# Patient Record
Sex: FEMALE | Race: WHITE | NOT HISPANIC OR LATINO | URBAN - METROPOLITAN AREA
[De-identification: names, ages, dates, MRNs, and addresses within clinical notes are randomized per-mention and may not be internally consistent; named-entity substitution may affect disease eponyms.]

---

## 2017-07-27 ENCOUNTER — OUTPATIENT (OUTPATIENT)
Dept: OUTPATIENT SERVICES | Facility: HOSPITAL | Age: 82
LOS: 1 days | End: 2017-07-27
Payer: MEDICARE

## 2017-07-27 VITALS
HEART RATE: 77 BPM | RESPIRATION RATE: 16 BRPM | OXYGEN SATURATION: 97 % | SYSTOLIC BLOOD PRESSURE: 117 MMHG | DIASTOLIC BLOOD PRESSURE: 81 MMHG

## 2017-07-27 VITALS
SYSTOLIC BLOOD PRESSURE: 146 MMHG | OXYGEN SATURATION: 95 % | HEIGHT: 61 IN | HEART RATE: 68 BPM | WEIGHT: 130.95 LBS | TEMPERATURE: 98 F | RESPIRATION RATE: 20 BRPM | DIASTOLIC BLOOD PRESSURE: 76 MMHG

## 2017-07-27 DIAGNOSIS — H25.811 COMBINED FORMS OF AGE-RELATED CATARACT, RIGHT EYE: ICD-10-CM

## 2017-07-27 DIAGNOSIS — Z98.89 OTHER SPECIFIED POSTPROCEDURAL STATES: Chronic | ICD-10-CM

## 2017-07-27 DIAGNOSIS — S82.873A DISPLACED PILON FRACTURE OF UNSPECIFIED TIBIA, INITIAL ENCOUNTER FOR CLOSED FRACTURE: Chronic | ICD-10-CM

## 2017-07-27 DIAGNOSIS — Z98.890 OTHER SPECIFIED POSTPROCEDURAL STATES: Chronic | ICD-10-CM

## 2017-07-27 DIAGNOSIS — Z90.49 ACQUIRED ABSENCE OF OTHER SPECIFIED PARTS OF DIGESTIVE TRACT: Chronic | ICD-10-CM

## 2017-07-27 DIAGNOSIS — Z90.710 ACQUIRED ABSENCE OF BOTH CERVIX AND UTERUS: Chronic | ICD-10-CM

## 2017-07-27 PROCEDURE — C1780: CPT

## 2017-07-27 PROCEDURE — 66984 XCAPSL CTRC RMVL W/O ECP: CPT | Mod: RT

## 2017-07-27 NOTE — ASU DISCHARGE PLAN (ADULT/PEDIATRIC). - NOTIFY
Swelling that continues/Persistent Nausea and Vomiting/Pain not relieved by Medications/Fever greater than 101/Bleeding that does not stop

## 2017-07-27 NOTE — ASU DISCHARGE PLAN (ADULT/PEDIATRIC). - SPECIAL INSTRUCTIONS
Your eye patch will remain on overnight. Your doctor will remove it at your appointment tomorrow and instruct you on what drops to take at that time. Continue drops as usual in the other eye. Bring the eye kit and all of your other eye drops to the office for each visit. You may experience some itching or scratchiness following surgery. This is normal and is usually relieved with Tylenol which can be taken 650mg by mouth every 4-6 hours for pain. Avoid Aspirin or Motrin. If you experience persistent decrease in vision, pain, excessive bleeding , temperature above 101, persistent nausea or vomiting contact your surgeon at [phone].

## 2018-05-12 ENCOUNTER — INPATIENT (INPATIENT)
Facility: HOSPITAL | Age: 83
LOS: 1 days | Discharge: HOME | DRG: 641 | End: 2018-05-14
Attending: HOSPITALIST | Admitting: HOSPITALIST
Payer: MEDICARE

## 2018-05-12 VITALS — HEIGHT: 63 IN | WEIGHT: 125 LBS

## 2018-05-12 DIAGNOSIS — Z90.710 ACQUIRED ABSENCE OF BOTH CERVIX AND UTERUS: Chronic | ICD-10-CM

## 2018-05-12 DIAGNOSIS — R42 DIZZINESS AND GIDDINESS: ICD-10-CM

## 2018-05-12 DIAGNOSIS — S82.873A DISPLACED PILON FRACTURE OF UNSPECIFIED TIBIA, INITIAL ENCOUNTER FOR CLOSED FRACTURE: Chronic | ICD-10-CM

## 2018-05-12 DIAGNOSIS — Z98.890 OTHER SPECIFIED POSTPROCEDURAL STATES: Chronic | ICD-10-CM

## 2018-05-12 DIAGNOSIS — C50.912 MALIGNANT NEOPLASM OF UNSPECIFIED SITE OF LEFT FEMALE BREAST: ICD-10-CM

## 2018-05-12 DIAGNOSIS — J18.1 LOBAR PNEUMONIA, UNSPECIFIED ORGANISM: ICD-10-CM

## 2018-05-12 DIAGNOSIS — J18.9 PNEUMONIA, UNSPECIFIED ORGANISM: ICD-10-CM

## 2018-05-12 DIAGNOSIS — Z98.89 OTHER SPECIFIED POSTPROCEDURAL STATES: Chronic | ICD-10-CM

## 2018-05-12 DIAGNOSIS — Z90.49 ACQUIRED ABSENCE OF OTHER SPECIFIED PARTS OF DIGESTIVE TRACT: Chronic | ICD-10-CM

## 2018-05-12 DIAGNOSIS — R01.1 CARDIAC MURMUR, UNSPECIFIED: ICD-10-CM

## 2018-05-12 LAB
ALBUMIN SERPL ELPH-MCNC: 4.3 G/DL — SIGNIFICANT CHANGE UP (ref 3.3–5.2)
ALP SERPL-CCNC: 68 U/L — SIGNIFICANT CHANGE UP (ref 40–120)
ALT FLD-CCNC: 15 U/L — SIGNIFICANT CHANGE UP
ANION GAP SERPL CALC-SCNC: 17 MMOL/L — SIGNIFICANT CHANGE UP (ref 5–17)
ANISOCYTOSIS BLD QL: SLIGHT — SIGNIFICANT CHANGE UP
APPEARANCE UR: CLEAR — SIGNIFICANT CHANGE UP
AST SERPL-CCNC: 21 U/L — SIGNIFICANT CHANGE UP
BASE EXCESS BLDV CALC-SCNC: 0.8 MMOL/L — SIGNIFICANT CHANGE UP (ref -2–2)
BASOPHILS # BLD AUTO: 0 K/UL — SIGNIFICANT CHANGE UP (ref 0–0.2)
BASOPHILS NFR BLD AUTO: 0 % — SIGNIFICANT CHANGE UP (ref 0–2)
BILIRUB SERPL-MCNC: 0.4 MG/DL — SIGNIFICANT CHANGE UP (ref 0.4–2)
BILIRUB UR-MCNC: NEGATIVE — SIGNIFICANT CHANGE UP
BUN SERPL-MCNC: 12 MG/DL — SIGNIFICANT CHANGE UP (ref 8–20)
BURR CELLS BLD QL SMEAR: PRESENT — SIGNIFICANT CHANGE UP
CA-I SERPL-SCNC: 1.12 MMOL/L — LOW (ref 1.15–1.33)
CALCIUM SERPL-MCNC: 8.8 MG/DL — SIGNIFICANT CHANGE UP (ref 8.6–10.2)
CHLORIDE BLDV-SCNC: 107 MMOL/L — SIGNIFICANT CHANGE UP (ref 98–107)
CHLORIDE SERPL-SCNC: 103 MMOL/L — SIGNIFICANT CHANGE UP (ref 98–107)
CO2 SERPL-SCNC: 22 MMOL/L — SIGNIFICANT CHANGE UP (ref 22–29)
COLOR SPEC: YELLOW — SIGNIFICANT CHANGE UP
CREAT SERPL-MCNC: 0.54 MG/DL — SIGNIFICANT CHANGE UP (ref 0.5–1.3)
DIFF PNL FLD: ABNORMAL
EOSINOPHIL # BLD AUTO: 0 K/UL — SIGNIFICANT CHANGE UP (ref 0–0.5)
EOSINOPHIL NFR BLD AUTO: 0 % — SIGNIFICANT CHANGE UP (ref 0–6)
EPI CELLS # UR: SIGNIFICANT CHANGE UP
GAS PNL BLDV: 143 MMOL/L — SIGNIFICANT CHANGE UP (ref 135–145)
GAS PNL BLDV: SIGNIFICANT CHANGE UP
GAS PNL BLDV: SIGNIFICANT CHANGE UP
GLUCOSE BLDV-MCNC: 106 MG/DL — HIGH (ref 70–99)
GLUCOSE SERPL-MCNC: 110 MG/DL — SIGNIFICANT CHANGE UP (ref 70–115)
GLUCOSE UR QL: NEGATIVE MG/DL — SIGNIFICANT CHANGE UP
HCO3 BLDV-SCNC: 25 MMOL/L — SIGNIFICANT CHANGE UP (ref 21–29)
HCT VFR BLD CALC: 38.4 % — SIGNIFICANT CHANGE UP (ref 37–47)
HCT VFR BLDA CALC: 39 — SIGNIFICANT CHANGE UP (ref 39–50)
HGB BLD CALC-MCNC: 12.7 G/DL — SIGNIFICANT CHANGE UP (ref 11.5–15.5)
HGB BLD-MCNC: 12.2 G/DL — SIGNIFICANT CHANGE UP (ref 12–16)
HYPOCHROMIA BLD QL: SLIGHT — SIGNIFICANT CHANGE UP
KETONES UR-MCNC: ABNORMAL
LACTATE BLDV-MCNC: 0.9 MMOL/L — SIGNIFICANT CHANGE UP (ref 0.5–2)
LACTATE BLDV-MCNC: 1.1 MMOL/L — SIGNIFICANT CHANGE UP (ref 0.5–2)
LEUKOCYTE ESTERASE UR-ACNC: ABNORMAL
LYMPHOCYTES # BLD AUTO: 0.4 K/UL — LOW (ref 1–4.8)
LYMPHOCYTES # BLD AUTO: 6 % — LOW (ref 20–55)
MCHC RBC-ENTMCNC: 27.3 PG — SIGNIFICANT CHANGE UP (ref 27–31)
MCHC RBC-ENTMCNC: 31.8 G/DL — LOW (ref 32–36)
MCV RBC AUTO: 85.9 FL — SIGNIFICANT CHANGE UP (ref 81–99)
MONOCYTES # BLD AUTO: 0.4 K/UL — SIGNIFICANT CHANGE UP (ref 0–0.8)
MONOCYTES NFR BLD AUTO: 5 % — SIGNIFICANT CHANGE UP (ref 3–10)
NEUTROPHILS # BLD AUTO: 6.4 K/UL — SIGNIFICANT CHANGE UP (ref 1.8–8)
NEUTROPHILS NFR BLD AUTO: 85 % — HIGH (ref 37–73)
NEUTS BAND # BLD: 3 % — SIGNIFICANT CHANGE UP (ref 0–8)
NITRITE UR-MCNC: NEGATIVE — SIGNIFICANT CHANGE UP
OTHER CELLS CSF MANUAL: 13 ML/DL — LOW (ref 18–22)
OVALOCYTES BLD QL SMEAR: SLIGHT — SIGNIFICANT CHANGE UP
PCO2 BLDV: 45 MMHG — SIGNIFICANT CHANGE UP (ref 35–50)
PH BLDV: 7.38 — SIGNIFICANT CHANGE UP (ref 7.32–7.43)
PH UR: 6.5 — SIGNIFICANT CHANGE UP (ref 5–8)
PLAT MORPH BLD: NORMAL — SIGNIFICANT CHANGE UP
PLATELET # BLD AUTO: 234 K/UL — SIGNIFICANT CHANGE UP (ref 150–400)
PO2 BLDV: 42 MMHG — SIGNIFICANT CHANGE UP (ref 25–45)
POIKILOCYTOSIS BLD QL AUTO: SLIGHT — SIGNIFICANT CHANGE UP
POTASSIUM BLDV-SCNC: 3.7 MMOL/L — SIGNIFICANT CHANGE UP (ref 3.4–4.5)
POTASSIUM SERPL-MCNC: 3.8 MMOL/L — SIGNIFICANT CHANGE UP (ref 3.5–5.3)
POTASSIUM SERPL-SCNC: 3.8 MMOL/L — SIGNIFICANT CHANGE UP (ref 3.5–5.3)
PROT SERPL-MCNC: 7.3 G/DL — SIGNIFICANT CHANGE UP (ref 6.6–8.7)
PROT UR-MCNC: NEGATIVE MG/DL — SIGNIFICANT CHANGE UP
RAPID RVP RESULT: SIGNIFICANT CHANGE UP
RBC # BLD: 4.47 M/UL — SIGNIFICANT CHANGE UP (ref 4.4–5.2)
RBC # FLD: 13.8 % — SIGNIFICANT CHANGE UP (ref 11–15.6)
RBC BLD AUTO: ABNORMAL
SAO2 % BLDV: 78 % — SIGNIFICANT CHANGE UP
SCHISTOCYTES BLD QL AUTO: SLIGHT — SIGNIFICANT CHANGE UP
SODIUM SERPL-SCNC: 142 MMOL/L — SIGNIFICANT CHANGE UP (ref 135–145)
SP GR SPEC: 1.01 — SIGNIFICANT CHANGE UP (ref 1.01–1.02)
TROPONIN T SERPL-MCNC: <0.01 NG/ML — SIGNIFICANT CHANGE UP (ref 0–0.06)
UROBILINOGEN FLD QL: NEGATIVE MG/DL — SIGNIFICANT CHANGE UP
VARIANT LYMPHS # BLD: 1 % — SIGNIFICANT CHANGE UP (ref 0–6)
WBC # BLD: 7.3 K/UL — SIGNIFICANT CHANGE UP (ref 4.8–10.8)
WBC # FLD AUTO: 7.3 K/UL — SIGNIFICANT CHANGE UP (ref 4.8–10.8)
WBC UR QL: SIGNIFICANT CHANGE UP

## 2018-05-12 PROCEDURE — 99285 EMERGENCY DEPT VISIT HI MDM: CPT

## 2018-05-12 PROCEDURE — 93010 ELECTROCARDIOGRAM REPORT: CPT

## 2018-05-12 PROCEDURE — 71045 X-RAY EXAM CHEST 1 VIEW: CPT | Mod: 26

## 2018-05-12 PROCEDURE — 71250 CT THORAX DX C-: CPT | Mod: 26

## 2018-05-12 PROCEDURE — 99223 1ST HOSP IP/OBS HIGH 75: CPT

## 2018-05-12 PROCEDURE — 70450 CT HEAD/BRAIN W/O DYE: CPT | Mod: 26

## 2018-05-12 RX ORDER — ACETAMINOPHEN 500 MG
650 TABLET ORAL ONCE
Qty: 0 | Refills: 0 | Status: DISCONTINUED | OUTPATIENT
Start: 2018-05-12 | End: 2018-05-14

## 2018-05-12 RX ORDER — PANTOPRAZOLE SODIUM 20 MG/1
40 TABLET, DELAYED RELEASE ORAL
Qty: 0 | Refills: 0 | Status: DISCONTINUED | OUTPATIENT
Start: 2018-05-12 | End: 2018-05-14

## 2018-05-12 RX ORDER — ANASTROZOLE 1 MG/1
1 TABLET ORAL DAILY
Qty: 0 | Refills: 0 | Status: DISCONTINUED | OUTPATIENT
Start: 2018-05-12 | End: 2018-05-14

## 2018-05-12 RX ORDER — LACTOBACILLUS ACIDOPHILUS 100MM CELL
1 CAPSULE ORAL DAILY
Qty: 0 | Refills: 0 | Status: DISCONTINUED | OUTPATIENT
Start: 2018-05-12 | End: 2018-05-13

## 2018-05-12 RX ORDER — IBUPROFEN 200 MG
400 TABLET ORAL ONCE
Qty: 0 | Refills: 0 | Status: COMPLETED | OUTPATIENT
Start: 2018-05-12 | End: 2018-05-12

## 2018-05-12 RX ORDER — ANASTROZOLE 1 MG/1
1 TABLET ORAL
Qty: 0 | Refills: 0 | COMMUNITY

## 2018-05-12 RX ORDER — ASPIRIN/CALCIUM CARB/MAGNESIUM 324 MG
81 TABLET ORAL DAILY
Qty: 0 | Refills: 0 | Status: DISCONTINUED | OUTPATIENT
Start: 2018-05-12 | End: 2018-05-13

## 2018-05-12 RX ORDER — ACETAMINOPHEN 500 MG
650 TABLET ORAL EVERY 6 HOURS
Qty: 0 | Refills: 0 | Status: DISCONTINUED | OUTPATIENT
Start: 2018-05-12 | End: 2018-05-14

## 2018-05-12 RX ORDER — SODIUM CHLORIDE 9 MG/ML
1000 INJECTION INTRAMUSCULAR; INTRAVENOUS; SUBCUTANEOUS ONCE
Qty: 0 | Refills: 0 | Status: COMPLETED | OUTPATIENT
Start: 2018-05-12 | End: 2018-05-12

## 2018-05-12 RX ORDER — ENOXAPARIN SODIUM 100 MG/ML
40 INJECTION SUBCUTANEOUS DAILY
Qty: 0 | Refills: 0 | Status: DISCONTINUED | OUTPATIENT
Start: 2018-05-12 | End: 2018-05-14

## 2018-05-12 RX ORDER — ACETAMINOPHEN 500 MG
650 TABLET ORAL ONCE
Qty: 0 | Refills: 0 | Status: COMPLETED | OUTPATIENT
Start: 2018-05-12 | End: 2018-05-12

## 2018-05-12 RX ADMIN — Medication 650 MILLIGRAM(S): at 16:33

## 2018-05-12 RX ADMIN — Medication 400 MILLIGRAM(S): at 23:58

## 2018-05-12 NOTE — ED ADULT TRIAGE NOTE - CHIEF COMPLAINT QUOTE
Pt woke up this morning with dizziness, went to urgent care and was told to come to ED.  Pt denies any chest pain/sob.

## 2018-05-12 NOTE — ED ADULT NURSE REASSESSMENT NOTE - NS ED NURSE REASSESS COMMENT FT1
Assumed care of pt @ 1930. pt a+ox4, lying in bed comfortably with family @ bedside. pt family upset, states they have not see MD in hours and have not gotten any results of test completed during the day. MD Lopez @ bedside to discuss POC and diagnosis with family.

## 2018-05-12 NOTE — ED ADULT NURSE NOTE - PSH
H/O: hysterectomy    History of cholecystectomy    History of eye surgery  macular pucker repair right eye  History of lumpectomy of left breast    Pilon fracture  left

## 2018-05-12 NOTE — ED ADULT NURSE NOTE - OBJECTIVE STATEMENT
Patient found sitting in chair, awake, alert, and oriented times 3, breathing unlabored.  Patient complaining of dizziness intermittent.  Patient states shakiness which is noted.  patient states vomiting x1 prior to ED arrival.  No complaints of pain.  No CP.  No SOB.  Patient states " light dull headache.  patient states dizziness increases when anxious.

## 2018-05-12 NOTE — ED PROVIDER NOTE - MEDICAL DECISION MAKING DETAILS
83 y/o awoke with shaking, pt has hx of JAMIE resting tremor but worse today, on rectal temperature pt has fever, addition tremors are probably chills, will obtain labs, cultures, UA, CXR and re-evaluate

## 2018-05-12 NOTE — ED PROVIDER NOTE - OBJECTIVE STATEMENT
81 y/o F pt with hx of Breast CA, HLD, hysterectomy and cholecystectomy presents to ED c/o dizziness and lightheadedness that began upon waking this morning at 7:00 am. She also notes nausea and 1 episode of emesis this morning. Pt states she began shaking this morning but daughter notes she has a tremor at baseline. Pt states she felt fine when she went to sleep last night at midnight and slept well. Her daughter-in-law states her  spoke with the pt this morning and was worried because she sounded off. She went to check on her and reports pt seemed okay other than being off balance and slightly confused. Pt recently lost her  4 months ago. She was place on Zoloft for anxiety but refuses to take it. Denies cough, weakness, numbness, tingling.  No further complaints at this time.   PMD: Shira

## 2018-05-12 NOTE — H&P ADULT - HISTORY OF PRESENT ILLNESS
83 y/o female with h/o left breast ca 2.5 years ago, came to the Er because of morning light headedness shaking, nausea and vomiting once. dizziness resolved in the ER but headache. no weakness or paraesthesia. patient added that dizziness is on and off over the last week. o/e: B/L carotid bruit and heart murmur suggestive of Aortic stenosis.  in the ER, patient had low grade fever. CXR showed RLL infiltrate. patient denies cough or URT symptoms. 83 y/o female with h/o left breast ca 2.5 years ago, came to the Er because of morning light headedness shaking, nausea and vomiting once. dizziness resolved in the ER but headache. no weakness or paraesthesia. patient added that dizziness is on and off over the last week. o/e: B/L carotid bruit and heart murmur suggestive of Aortic stenosis.  in the ER, patient had 101 fever. CXR showed RLL infiltrate. patient denies cough or URT symptoms.

## 2018-05-12 NOTE — ED ADULT NURSE NOTE - ED STAT RN HANDOFF DETAILS
pt a+ox4, lying comfortably in bed, denies chest pain or SOB. pt admitted, spoke with accepting RN on 4 twr, pt accepted. will complete full bedside report when pt transported to floor.

## 2018-05-12 NOTE — ED STATDOCS - PROGRESS NOTE DETAILS
81yo F hx of breast CA p/w confusions episode this morning with feleing off balance also a little lightheaded, shaking/chills and an episode of vomiting. Denies f/cp/sob/palpitations/ cough/rash//abd.pain/d/c/dysuria/hematuria. will order labs send to main for further eval

## 2018-05-13 DIAGNOSIS — E78.00 PURE HYPERCHOLESTEROLEMIA, UNSPECIFIED: ICD-10-CM

## 2018-05-13 LAB
APPEARANCE UR: CLEAR — SIGNIFICANT CHANGE UP
BACTERIA # UR AUTO: ABNORMAL
BILIRUB UR-MCNC: NEGATIVE — SIGNIFICANT CHANGE UP
CHOLEST SERPL-MCNC: 218 MG/DL — HIGH (ref 110–199)
COLOR SPEC: SIGNIFICANT CHANGE UP
DIFF PNL FLD: ABNORMAL
EPI CELLS # UR: SIGNIFICANT CHANGE UP
GLUCOSE UR QL: NEGATIVE MG/DL — SIGNIFICANT CHANGE UP
HDLC SERPL-MCNC: 72 MG/DL — SIGNIFICANT CHANGE UP
KETONES UR-MCNC: NEGATIVE — SIGNIFICANT CHANGE UP
LEUKOCYTE ESTERASE UR-ACNC: ABNORMAL
LIPID PNL WITH DIRECT LDL SERPL: 133 MG/DL — SIGNIFICANT CHANGE UP
NITRITE UR-MCNC: NEGATIVE — SIGNIFICANT CHANGE UP
PH UR: 6.5 — SIGNIFICANT CHANGE UP (ref 5–8)
PROT UR-MCNC: NEGATIVE MG/DL — SIGNIFICANT CHANGE UP
RBC CASTS # UR COMP ASSIST: ABNORMAL /HPF (ref 0–4)
SP GR SPEC: 1 — LOW (ref 1.01–1.02)
TOTAL CHOLESTEROL/HDL RATIO MEASUREMENT: 3 RATIO — LOW (ref 3.3–7.1)
TRIGL SERPL-MCNC: 67 MG/DL — SIGNIFICANT CHANGE UP (ref 10–200)
UROBILINOGEN FLD QL: NEGATIVE MG/DL — SIGNIFICANT CHANGE UP
WBC UR QL: SIGNIFICANT CHANGE UP

## 2018-05-13 PROCEDURE — 99222 1ST HOSP IP/OBS MODERATE 55: CPT

## 2018-05-13 PROCEDURE — 93880 EXTRACRANIAL BILAT STUDY: CPT | Mod: 26

## 2018-05-13 PROCEDURE — 99232 SBSQ HOSP IP/OBS MODERATE 35: CPT

## 2018-05-13 RX ORDER — SENNA PLUS 8.6 MG/1
2 TABLET ORAL AT BEDTIME
Qty: 0 | Refills: 0 | Status: DISCONTINUED | OUTPATIENT
Start: 2018-05-13 | End: 2018-05-14

## 2018-05-13 RX ORDER — DOCUSATE SODIUM 100 MG
100 CAPSULE ORAL
Qty: 0 | Refills: 0 | Status: DISCONTINUED | OUTPATIENT
Start: 2018-05-13 | End: 2018-05-14

## 2018-05-13 RX ORDER — ACETAMINOPHEN 500 MG
650 TABLET ORAL EVERY 4 HOURS
Qty: 0 | Refills: 0 | Status: DISCONTINUED | OUTPATIENT
Start: 2018-05-13 | End: 2018-05-14

## 2018-05-13 RX ORDER — LANOLIN ALCOHOL/MO/W.PET/CERES
3 CREAM (GRAM) TOPICAL AT BEDTIME
Qty: 0 | Refills: 0 | Status: DISCONTINUED | OUTPATIENT
Start: 2018-05-13 | End: 2018-05-14

## 2018-05-13 RX ADMIN — Medication 3 MILLIGRAM(S): at 22:01

## 2018-05-13 RX ADMIN — SENNA PLUS 2 TABLET(S): 8.6 TABLET ORAL at 22:01

## 2018-05-13 RX ADMIN — Medication 1 TABLET(S): at 11:26

## 2018-05-13 RX ADMIN — Medication 400 MILLIGRAM(S): at 00:00

## 2018-05-13 RX ADMIN — Medication 81 MILLIGRAM(S): at 11:26

## 2018-05-13 RX ADMIN — PANTOPRAZOLE SODIUM 40 MILLIGRAM(S): 20 TABLET, DELAYED RELEASE ORAL at 05:47

## 2018-05-13 RX ADMIN — Medication 100 MILLIGRAM(S): at 17:15

## 2018-05-13 RX ADMIN — Medication 650 MILLIGRAM(S): at 15:33

## 2018-05-13 RX ADMIN — Medication 650 MILLIGRAM(S): at 14:33

## 2018-05-13 RX ADMIN — ENOXAPARIN SODIUM 40 MILLIGRAM(S): 100 INJECTION SUBCUTANEOUS at 22:01

## 2018-05-13 RX ADMIN — ANASTROZOLE 1 MILLIGRAM(S): 1 TABLET ORAL at 11:26

## 2018-05-13 RX ADMIN — Medication 100 MILLIGRAM(S): at 06:15

## 2018-05-13 NOTE — CONSULT NOTE ADULT - PROBLEM SELECTOR RECOMMENDATION 9
-likely due to dehydration 2/2 PNA  -encourage fluid intake  -carotid dopplers  -echocardiogram  -outpatient ischemic evaluation

## 2018-05-13 NOTE — PROGRESS NOTE ADULT - SUBJECTIVE AND OBJECTIVE BOX
YULI DANGELO Female 82y [id number]    Patient is a 82y old  Female who presents with a chief complaint of dizziness on and off x 1 week (12 May 2018 21:51)      Subjective/objective:  Pt seen and examined at bedside, admitted over night for dizziness. This morning Pt reports feeling much better, she further stated she was not feeling well yesterday with shaking and went to see urgent care and was told to go to ED. Today she has no complaints.     Review of system:  No fever, chills, nausea, vomiting, headache, dizziness, chest pain, SOB or palpitation.      PHYSICAL EXAM:    Vital Signs Last 24 Hrs  T(C): 36.7 (13 May 2018 09:42), Max: 38.6 (12 May 2018 16:10)  T(F): 98.1 (13 May 2018 09:42), Max: 101.5 (12 May 2018 16:10)  HR: 80 (13 May 2018 09:42) (66 - 100)  BP: 102/60 (13 May 2018 09:42) (102/60 - 129/82)  BP(mean): --  RR: 18 (13 May 2018 09:42) (18 - 18)  SpO2: 97% (13 May 2018 09:42) (94% - 97%)    GENERAL: Pt lying comfortably, NAD.  CHEST/LUNG: Clear to auscultatation bilaterally; No wheezing.  HEART: S1S2+, Regular rate and rhythm; _ systolic murmurs.  ABDOMEN: Soft, Nontender, Nondistended; Bowel sounds present.  Extremities: No LE edema, pulses +  NEURO: AAOX3, no focal deficits, no motor r sensory loss.  PSYCH: normal mood.          MEDICATIONS  (STANDING):  acetaminophen   Tablet. 650 milliGRAM(s) Oral once  anastrozole 1 milliGRAM(s) Oral daily  aspirin enteric coated 81 milliGRAM(s) Oral daily  docusate sodium 100 milliGRAM(s) Oral two times a day  enoxaparin Injectable 40 milliGRAM(s) SubCutaneous daily  lactobacillus acidophilus 1 Tablet(s) Oral daily  levoFLOXacin IVPB 500 milliGRAM(s) IV Intermittent every 24 hours  melatonin 3 milliGRAM(s) Oral at bedtime  pantoprazole    Tablet 40 milliGRAM(s) Oral before breakfast  senna 2 Tablet(s) Oral at bedtime    MEDICATIONS  (PRN):  acetaminophen   Tablet 650 milliGRAM(s) Oral every 6 hours PRN For Temp greater than 38 C (100.4 F)        Labs:  LABS:                        12.2   7.3   )-----------( 234      ( 12 May 2018 16:28 )             38.4     05-12    142  |  103  |  12.0  ----------------------------<  110  3.8   |  22.0  |  0.54    Ca    8.8      12 May 2018 16:28    TPro  7.3  /  Alb  4.3  /  TBili  0.4  /  DBili  x   /  AST  21  /  ALT  15  /  AlkPhos  68  05-12        LIVER FUNCTIONS - ( 12 May 2018 16:28 )  Alb: 4.3 g/dL / Pro: 7.3 g/dL / ALK PHOS: 68 U/L / ALT: 15 U/L / AST: 21 U/L / GGT: x           Urinalysis Basic - ( 13 May 2018 12:57 )    Color: Pale Yellow / Appearance: x / S.005 / pH: x  Gluc: x / Ketone: Negative  / Bili: Negative / Urobili: Negative mg/dL   Blood: x / Protein: Negative mg/dL / Nitrite: Negative   Leuk Esterase: Trace / RBC: 3-5 /HPF / WBC 3-5   Sq Epi: x / Non Sq Epi: Occasional / Bacteria: Occasional        CARDIAC MARKERS ( 12 May 2018 16:28 )  x     / <0.01 ng/mL / x     / x     / x

## 2018-05-13 NOTE — PROGRESS NOTE ADULT - ASSESSMENT
83 y/o female with PMHx of left breast cancer s/p mastectomy /RT currently on anastrozole at home, no known other medical problems, not on any other meds at home, came to the ED for not feeling well, lightheadedness, dizziness, shaking and one episode of nausea /vomiting. IN ED was febrile and Admitted to medicine for dizziness and suspected PNA as CXR showed infiltrated however CT chest ruled out PNA.      Plan:    > Dizziness:  - Unclear etiology, likely due dehydration. Pt feeling better today.  - CD with no stenosis, TTE pending.  - Cardiology was consulted overnight.     > ? PNA:  - Pt was febrile in ED, CXR b/l opacities may be chronic. Pt was started on abx overnight. However CT chest done and shows no PNA, Pt afebrile now, no leukocytosis, doubt active PNA. Will d/c antibiotics and monitor off abx.     >Heart Murmur- Pending TTE. Cardiology on board.   >Nausea /vomiting- Brief episode, resolved.   >Pulmonary nodule- Incidental finding on CT. O/p pulmonary f/u on discharge.  >H/o Breast cancer- S/p mastectomy /RT, on anastrazole, c/w it. O/p f/u.   >DVT ppx- Lovenox.

## 2018-05-13 NOTE — CONSULT NOTE ADULT - ASSESSMENT
81 y/o female presents with dizziness and lightheadedness, found to have RLL pneumonia. Exam reveals systolic murmur and carotid bruit.

## 2018-05-14 VITALS
TEMPERATURE: 98 F | HEART RATE: 70 BPM | RESPIRATION RATE: 18 BRPM | SYSTOLIC BLOOD PRESSURE: 134 MMHG | OXYGEN SATURATION: 98 % | DIASTOLIC BLOOD PRESSURE: 72 MMHG

## 2018-05-14 LAB
ANION GAP SERPL CALC-SCNC: 14 MMOL/L — SIGNIFICANT CHANGE UP (ref 5–17)
BUN SERPL-MCNC: 10 MG/DL — SIGNIFICANT CHANGE UP (ref 8–20)
CALCIUM SERPL-MCNC: 9.3 MG/DL — SIGNIFICANT CHANGE UP (ref 8.6–10.2)
CHLORIDE SERPL-SCNC: 105 MMOL/L — SIGNIFICANT CHANGE UP (ref 98–107)
CO2 SERPL-SCNC: 24 MMOL/L — SIGNIFICANT CHANGE UP (ref 22–29)
CREAT SERPL-MCNC: 0.53 MG/DL — SIGNIFICANT CHANGE UP (ref 0.5–1.3)
GLUCOSE BLDC GLUCOMTR-MCNC: 94 MG/DL — SIGNIFICANT CHANGE UP (ref 70–99)
GLUCOSE SERPL-MCNC: 107 MG/DL — SIGNIFICANT CHANGE UP (ref 70–115)
HBA1C BLD-MCNC: 5.4 % — SIGNIFICANT CHANGE UP (ref 4–5.6)
HCT VFR BLD CALC: 35.7 % — LOW (ref 37–47)
HGB BLD-MCNC: 11.6 G/DL — LOW (ref 12–16)
MCHC RBC-ENTMCNC: 27.8 PG — SIGNIFICANT CHANGE UP (ref 27–31)
MCHC RBC-ENTMCNC: 32.5 G/DL — SIGNIFICANT CHANGE UP (ref 32–36)
MCV RBC AUTO: 85.4 FL — SIGNIFICANT CHANGE UP (ref 81–99)
PLATELET # BLD AUTO: 233 K/UL — SIGNIFICANT CHANGE UP (ref 150–400)
POTASSIUM SERPL-MCNC: 3.9 MMOL/L — SIGNIFICANT CHANGE UP (ref 3.5–5.3)
POTASSIUM SERPL-SCNC: 3.9 MMOL/L — SIGNIFICANT CHANGE UP (ref 3.5–5.3)
RBC # BLD: 4.18 M/UL — LOW (ref 4.4–5.2)
RBC # FLD: 13.8 % — SIGNIFICANT CHANGE UP (ref 11–15.6)
SODIUM SERPL-SCNC: 143 MMOL/L — SIGNIFICANT CHANGE UP (ref 135–145)
WBC # BLD: 5.7 K/UL — SIGNIFICANT CHANGE UP (ref 4.8–10.8)
WBC # FLD AUTO: 5.7 K/UL — SIGNIFICANT CHANGE UP (ref 4.8–10.8)

## 2018-05-14 PROCEDURE — 99238 HOSP IP/OBS DSCHRG MGMT 30/<: CPT

## 2018-05-14 RX ADMIN — ANASTROZOLE 1 MILLIGRAM(S): 1 TABLET ORAL at 12:33

## 2018-05-14 RX ADMIN — Medication 100 MILLIGRAM(S): at 05:24

## 2018-05-14 RX ADMIN — PANTOPRAZOLE SODIUM 40 MILLIGRAM(S): 20 TABLET, DELAYED RELEASE ORAL at 05:24

## 2018-05-14 RX ADMIN — Medication 650 MILLIGRAM(S): at 04:16

## 2018-05-14 NOTE — DISCHARGE NOTE ADULT - CARE PLAN
Principal Discharge DX:	Dizziness  Goal:	Resolved.  Assessment and plan of treatment:	Dizziness likely related to dehydration. Carotid doppler with no stenosis, TTE with normal EF. Keep your self well hydrated.  Secondary Diagnosis:	Malignant neoplasm of female breast, unspecified estrogen receptor status, unspecified laterality, unspecified site of breast  Assessment and plan of treatment:	C/w home medicine and follow up with your PMD.  Secondary Diagnosis:	Chronic diastolic congestive heart failure  Assessment and plan of treatment:	grade-II diastolic heart failure on echo. Stable. Follow up with cardiology in clinic. Principal Discharge DX:	Dizziness  Goal:	Resolved.  Assessment and plan of treatment:	Dizziness likely related to dehydration. Carotid doppler with no stenosis, TTE with normal EF. Keep your self well hydrated.  Secondary Diagnosis:	Malignant neoplasm of female breast, unspecified estrogen receptor status, unspecified laterality, unspecified site of breast  Assessment and plan of treatment:	C/w home medicine and follow up with your PMD.  Secondary Diagnosis:	Chronic diastolic congestive heart failure  Assessment and plan of treatment:	grade-II diastolic heart failure on echo. Stable. Follow up with cardiology in clinic.  Secondary Diagnosis:	Pulmonary nodule  Assessment and plan of treatment:	Incidental finding on CT chest. Follow up with pulmonology and need to have repeat CT scan chest in 3 months.

## 2018-05-14 NOTE — DISCHARGE NOTE ADULT - CARE PROVIDERS DIRECT ADDRESSES
,DirectAddress_Unknown,[email or web address]

## 2018-05-14 NOTE — DISCHARGE NOTE ADULT - PROVIDER TOKENS
FREE:[LAST:[PCP],PHONE:[(   )    -],FAX:[(   )    -]],TOKEN:'[id number]' TOKEN:'[id number]',FREE:[LAST:[PCP],PHONE:[(   )    -],FAX:[(   )    -]],TOKEN:'[id number]'

## 2018-05-14 NOTE — DISCHARGE NOTE ADULT - SECONDARY DIAGNOSIS.
Malignant neoplasm of female breast, unspecified estrogen receptor status, unspecified laterality, unspecified site of breast Chronic diastolic congestive heart failure Pulmonary nodule

## 2018-05-14 NOTE — DISCHARGE NOTE ADULT - PATIENT PORTAL LINK FT
You can access the [facility], offered by [facility], by registering with the following website: [email or web address]

## 2018-05-14 NOTE — DISCHARGE NOTE ADULT - CARE PROVIDER_API CALL
PCP,   Phone: (   )    -  Fax: (   )    -    Cirilo Us), Cardiovascular Disease  [address] [place] [address]  Phone: [phone]  Fax: [phone] Cirilo Us), Cardiovascular Disease  [address] [place] [address]  Phone: [phone]  Fax: [phone]    PCP,   Phone: (   )    -  Fax: (   )    -    Josh Minaya), Critical Care Medicine; Pulmonary Disease; Sleep Medicine  [address] [place] [address]  Phone: [phone]  Fax: [phone]

## 2018-05-14 NOTE — DISCHARGE NOTE ADULT - PLAN OF CARE
Resolved. Dizziness likely related to dehydration. Carotid doppler with no stenosis, TTE with normal EF. Keep your self well hydrated. C/w home medicine and follow up with your PMD. grade-II diastolic heart failure on echo. Stable. Follow up with cardiology in clinic. Incidental finding on CT chest. Follow up with pulmonology and need to have repeat CT scan chest in 3 months.

## 2018-05-14 NOTE — DISCHARGE NOTE ADULT - HOSPITAL COURSE
81 y/o female with PMHx of left breast cancer s/p mastectomy /RT currently on anastrozole at home, no known other medical problems, not on any other meds at home except multivitamins, came to the ED for not feeling well, lightheadedness, dizziness, shaking / chills and one episode of nausea /vomiting. IN ED was febrile and Admitted to medicine for dizziness and suspected PNA as CXR showed infiltrated however CT chest ruled out PNA. Pt underwent dizziness w/u- Cd with no stenosis, EF normal on TTE- shows grade- II DD- dizziness likely related to dehydration. Sx resolved. Pt deemed clear from cardiology with outpatient f/u. Pt is medically stable for discharge.     PHYSICAL EXAM:    Vital Signs Last 24 Hrs  T(C): 36.8 (14 May 2018 05:21), Max: 36.8 (13 May 2018 18:16)  T(F): 98.2 (14 May 2018 05:21), Max: 98.3 (13 May 2018 18:16)  HR: 65 (14 May 2018 05:21) (65 - 87)  BP: 104/64 (14 May 2018 05:21) (104/64 - 125/67)  BP(mean): --  RR: 16 (14 May 2018 05:21) (16 - 18)  SpO2: 98% (14 May 2018 05:21) (96% - 98%)    GENERAL: Pt lying comfortably, NAD.  CHEST/LUNG: Clear to auscultatation bilaterally; No wheezing.  HEART: S1S2+, Regular rate and rhythm; No murmurs.  ABDOMEN: Soft, Nontender, Nondistended; Bowel sounds present.  NEURO: AAOX3, no focal deficits, no motor r sensory loss.  PSYCH: normal mood.

## 2018-05-17 LAB
CULTURE RESULTS: SIGNIFICANT CHANGE UP
CULTURE RESULTS: SIGNIFICANT CHANGE UP
SPECIMEN SOURCE: SIGNIFICANT CHANGE UP
SPECIMEN SOURCE: SIGNIFICANT CHANGE UP

## 2018-05-23 PROCEDURE — 87633 RESP VIRUS 12-25 TARGETS: CPT

## 2018-05-23 PROCEDURE — 83605 ASSAY OF LACTIC ACID: CPT

## 2018-05-23 PROCEDURE — 82330 ASSAY OF CALCIUM: CPT

## 2018-05-23 PROCEDURE — 84295 ASSAY OF SERUM SODIUM: CPT

## 2018-05-23 PROCEDURE — 70450 CT HEAD/BRAIN W/O DYE: CPT

## 2018-05-23 PROCEDURE — 82947 ASSAY GLUCOSE BLOOD QUANT: CPT

## 2018-05-23 PROCEDURE — 84132 ASSAY OF SERUM POTASSIUM: CPT

## 2018-05-23 PROCEDURE — 93306 TTE W/DOPPLER COMPLETE: CPT

## 2018-05-23 PROCEDURE — 82962 GLUCOSE BLOOD TEST: CPT

## 2018-05-23 PROCEDURE — 99285 EMERGENCY DEPT VISIT HI MDM: CPT | Mod: 25

## 2018-05-23 PROCEDURE — 84484 ASSAY OF TROPONIN QUANT: CPT

## 2018-05-23 PROCEDURE — 85027 COMPLETE CBC AUTOMATED: CPT

## 2018-05-23 PROCEDURE — 71045 X-RAY EXAM CHEST 1 VIEW: CPT

## 2018-05-23 PROCEDURE — 87486 CHLMYD PNEUM DNA AMP PROBE: CPT

## 2018-05-23 PROCEDURE — 82435 ASSAY OF BLOOD CHLORIDE: CPT

## 2018-05-23 PROCEDURE — 87581 M.PNEUMON DNA AMP PROBE: CPT

## 2018-05-23 PROCEDURE — 83036 HEMOGLOBIN GLYCOSYLATED A1C: CPT

## 2018-05-23 PROCEDURE — 80048 BASIC METABOLIC PNL TOTAL CA: CPT

## 2018-05-23 PROCEDURE — 36415 COLL VENOUS BLD VENIPUNCTURE: CPT

## 2018-05-23 PROCEDURE — 87798 DETECT AGENT NOS DNA AMP: CPT

## 2018-05-23 PROCEDURE — 85014 HEMATOCRIT: CPT

## 2018-05-23 PROCEDURE — 71250 CT THORAX DX C-: CPT

## 2018-05-23 PROCEDURE — 80053 COMPREHEN METABOLIC PANEL: CPT

## 2018-05-23 PROCEDURE — 96374 THER/PROPH/DIAG INJ IV PUSH: CPT

## 2018-05-23 PROCEDURE — 82803 BLOOD GASES ANY COMBINATION: CPT

## 2018-05-23 PROCEDURE — 80061 LIPID PANEL: CPT

## 2018-05-23 PROCEDURE — 93005 ELECTROCARDIOGRAM TRACING: CPT

## 2018-05-23 PROCEDURE — 93880 EXTRACRANIAL BILAT STUDY: CPT

## 2018-05-23 PROCEDURE — 81001 URINALYSIS AUTO W/SCOPE: CPT

## 2018-05-23 PROCEDURE — 87040 BLOOD CULTURE FOR BACTERIA: CPT

## 2018-06-26 ENCOUNTER — EMERGENCY (EMERGENCY)
Facility: HOSPITAL | Age: 83
LOS: 1 days | Discharge: HOME | End: 2018-06-26
Attending: EMERGENCY MEDICINE
Payer: MEDICARE

## 2018-06-26 VITALS
RESPIRATION RATE: 16 BRPM | HEART RATE: 71 BPM | SYSTOLIC BLOOD PRESSURE: 115 MMHG | OXYGEN SATURATION: 99 % | DIASTOLIC BLOOD PRESSURE: 72 MMHG | TEMPERATURE: 97 F

## 2018-06-26 VITALS — WEIGHT: 125 LBS | HEIGHT: 62 IN

## 2018-06-26 DIAGNOSIS — Z98.89 OTHER SPECIFIED POSTPROCEDURAL STATES: Chronic | ICD-10-CM

## 2018-06-26 DIAGNOSIS — Z90.49 ACQUIRED ABSENCE OF OTHER SPECIFIED PARTS OF DIGESTIVE TRACT: Chronic | ICD-10-CM

## 2018-06-26 DIAGNOSIS — Z90.710 ACQUIRED ABSENCE OF BOTH CERVIX AND UTERUS: Chronic | ICD-10-CM

## 2018-06-26 DIAGNOSIS — S82.873A DISPLACED PILON FRACTURE OF UNSPECIFIED TIBIA, INITIAL ENCOUNTER FOR CLOSED FRACTURE: Chronic | ICD-10-CM

## 2018-06-26 DIAGNOSIS — Z98.890 OTHER SPECIFIED POSTPROCEDURAL STATES: Chronic | ICD-10-CM

## 2018-06-26 LAB
ALBUMIN SERPL ELPH-MCNC: 4.2 G/DL — SIGNIFICANT CHANGE UP (ref 3.3–5.2)
ALP SERPL-CCNC: 75 U/L — SIGNIFICANT CHANGE UP (ref 40–120)
ALT FLD-CCNC: 14 U/L — SIGNIFICANT CHANGE UP
ANION GAP SERPL CALC-SCNC: 14 MMOL/L — SIGNIFICANT CHANGE UP (ref 5–17)
APTT BLD: 38.5 SEC — HIGH (ref 27.5–37.4)
AST SERPL-CCNC: 21 U/L — SIGNIFICANT CHANGE UP
BASOPHILS # BLD AUTO: 0.1 K/UL — SIGNIFICANT CHANGE UP (ref 0–0.2)
BASOPHILS NFR BLD AUTO: 1.1 % — SIGNIFICANT CHANGE UP (ref 0–2)
BILIRUB SERPL-MCNC: 0.3 MG/DL — LOW (ref 0.4–2)
BUN SERPL-MCNC: 12 MG/DL — SIGNIFICANT CHANGE UP (ref 8–20)
CALCIUM SERPL-MCNC: 9.6 MG/DL — SIGNIFICANT CHANGE UP (ref 8.6–10.2)
CHLORIDE SERPL-SCNC: 103 MMOL/L — SIGNIFICANT CHANGE UP (ref 98–107)
CO2 SERPL-SCNC: 24 MMOL/L — SIGNIFICANT CHANGE UP (ref 22–29)
CREAT SERPL-MCNC: 0.59 MG/DL — SIGNIFICANT CHANGE UP (ref 0.5–1.3)
EOSINOPHIL # BLD AUTO: 0.4 K/UL — SIGNIFICANT CHANGE UP (ref 0–0.5)
EOSINOPHIL NFR BLD AUTO: 7.3 % — HIGH (ref 0–6)
GLUCOSE SERPL-MCNC: 93 MG/DL — SIGNIFICANT CHANGE UP (ref 70–115)
HCT VFR BLD CALC: 37.2 % — SIGNIFICANT CHANGE UP (ref 37–47)
HGB BLD-MCNC: 12.1 G/DL — SIGNIFICANT CHANGE UP (ref 12–16)
INR BLD: 1.13 RATIO — SIGNIFICANT CHANGE UP (ref 0.88–1.16)
LYMPHOCYTES # BLD AUTO: 1.3 K/UL — SIGNIFICANT CHANGE UP (ref 1–4.8)
LYMPHOCYTES # BLD AUTO: 24.1 % — SIGNIFICANT CHANGE UP (ref 20–55)
MCHC RBC-ENTMCNC: 27.8 PG — SIGNIFICANT CHANGE UP (ref 27–31)
MCHC RBC-ENTMCNC: 32.5 G/DL — SIGNIFICANT CHANGE UP (ref 32–36)
MCV RBC AUTO: 85.3 FL — SIGNIFICANT CHANGE UP (ref 81–99)
MONOCYTES # BLD AUTO: 0.5 K/UL — SIGNIFICANT CHANGE UP (ref 0–0.8)
MONOCYTES NFR BLD AUTO: 8.6 % — SIGNIFICANT CHANGE UP (ref 3–10)
NEUTROPHILS # BLD AUTO: 3.2 K/UL — SIGNIFICANT CHANGE UP (ref 1.8–8)
NEUTROPHILS NFR BLD AUTO: 58.9 % — SIGNIFICANT CHANGE UP (ref 37–73)
PLATELET # BLD AUTO: 238 K/UL — SIGNIFICANT CHANGE UP (ref 150–400)
POTASSIUM SERPL-MCNC: 4.2 MMOL/L — SIGNIFICANT CHANGE UP (ref 3.5–5.3)
POTASSIUM SERPL-SCNC: 4.2 MMOL/L — SIGNIFICANT CHANGE UP (ref 3.5–5.3)
PROT SERPL-MCNC: 8.1 G/DL — SIGNIFICANT CHANGE UP (ref 6.6–8.7)
PROTHROM AB SERPL-ACNC: 12.5 SEC — SIGNIFICANT CHANGE UP (ref 9.8–12.7)
RBC # BLD: 4.36 M/UL — LOW (ref 4.4–5.2)
RBC # FLD: 13.5 % — SIGNIFICANT CHANGE UP (ref 11–15.6)
SODIUM SERPL-SCNC: 141 MMOL/L — SIGNIFICANT CHANGE UP (ref 135–145)
WBC # BLD: 5.4 K/UL — SIGNIFICANT CHANGE UP (ref 4.8–10.8)
WBC # FLD AUTO: 5.4 K/UL — SIGNIFICANT CHANGE UP (ref 4.8–10.8)

## 2018-06-26 PROCEDURE — 99284 EMERGENCY DEPT VISIT MOD MDM: CPT | Mod: 25

## 2018-06-26 PROCEDURE — 85730 THROMBOPLASTIN TIME PARTIAL: CPT

## 2018-06-26 PROCEDURE — 96374 THER/PROPH/DIAG INJ IV PUSH: CPT

## 2018-06-26 PROCEDURE — 96375 TX/PRO/DX INJ NEW DRUG ADDON: CPT

## 2018-06-26 PROCEDURE — 85027 COMPLETE CBC AUTOMATED: CPT

## 2018-06-26 PROCEDURE — 36415 COLL VENOUS BLD VENIPUNCTURE: CPT

## 2018-06-26 PROCEDURE — 85610 PROTHROMBIN TIME: CPT

## 2018-06-26 PROCEDURE — 99284 EMERGENCY DEPT VISIT MOD MDM: CPT

## 2018-06-26 PROCEDURE — 80053 COMPREHEN METABOLIC PANEL: CPT

## 2018-06-26 RX ORDER — DIPHENHYDRAMINE HCL 50 MG
1 CAPSULE ORAL
Qty: 15 | Refills: 0 | OUTPATIENT
Start: 2018-06-26 | End: 2018-06-30

## 2018-06-26 RX ORDER — DIPHENHYDRAMINE HCL 50 MG
50 CAPSULE ORAL ONCE
Qty: 0 | Refills: 0 | Status: COMPLETED | OUTPATIENT
Start: 2018-06-26 | End: 2018-06-26

## 2018-06-26 RX ORDER — SODIUM CHLORIDE 9 MG/ML
3 INJECTION INTRAMUSCULAR; INTRAVENOUS; SUBCUTANEOUS ONCE
Qty: 0 | Refills: 0 | Status: COMPLETED | OUTPATIENT
Start: 2018-06-26 | End: 2018-06-26

## 2018-06-26 RX ADMIN — SODIUM CHLORIDE 3 MILLILITER(S): 9 INJECTION INTRAMUSCULAR; INTRAVENOUS; SUBCUTANEOUS at 09:10

## 2018-06-26 RX ADMIN — Medication 50 MILLIGRAM(S): at 09:02

## 2018-06-26 RX ADMIN — Medication 125 MILLIGRAM(S): at 09:02

## 2018-06-26 NOTE — ED ADULT TRIAGE NOTE - CHIEF COMPLAINT QUOTE
"I have a rash and at first I thought it was a mosquito bite but then they turned into welts. " Pt has welts to arms and chest area. Pt states the welts felt itchy.  Denies fever/chills or new soaps.

## 2018-06-26 NOTE — ED PROVIDER NOTE - CHPI ED SYMPTOMS NEG
no difficulty breathing/no throat itching/no shortness of breath/no cough/no difficulty swallowing/no nausea/no vomiting

## 2018-06-26 NOTE — ED PROVIDER NOTE - OBJECTIVE STATEMENT
patient developed hives on Saturday while at diner no fever no chills no nausea no vomiting states hives itch  and burns on no new meds no fever no sob no diarhea

## 2018-12-07 NOTE — ASU PATIENT PROFILE, ADULT - PSH
A-line H/O: hysterectomy    History of cholecystectomy    History of eye surgery  macular pucker repair right eye  History of lumpectomy of left breast    Pilon fracture  left

## 2019-05-09 NOTE — ASU PREOP CHECKLIST - DENTURES
1. Have you been to the ER, urgent care clinic since your last visit? Hospitalized since your last visit? No.     2. Have you seen or consulted any other health care providers outside of the [address] [place] [address] since your last visit? Include any pap smears or colon screening.  No.     Chief Complaint   Patient presents with   [facility] Wellness Visit no

## 2021-12-01 NOTE — H&P ADULT - PSH
Yesterday N/V/D- today abd pain and severe lower back pain H/O: hysterectomy    History of cholecystectomy    History of eye surgery  macular pucker repair right eye  History of lumpectomy of left breast    Pilon fracture  left

## 2022-04-15 NOTE — H&P ADULT - EYES
Updated med list.   Patient says he is taking hydralazine 100mg tid and tolerating well.        detailed exam PERRL/EOMI

## 2023-10-13 NOTE — ED PROVIDER NOTE - CADM POA URETHRAL CATHETER
in years) exertional mid chest/epigastric fullness since Dilt increased. No known snoring. +occ insomnia (difficulty staying asleep).  No further fall since PM 9/17. No dyspnea at rest. +chronic MCCALL; however, she barely exerts. No orthopnea/PND. +long h/o occ palps x few minutes. No syncope. +chronic mild LE edema. No claudication. No cough.   She had weakness and reduce appetite on Amio.   4/16 hospitalized for AFIB + RVR (palps) -> converted with Amio. No further palps.   6/16 hospitalized for AFIB + RVR (chest pressure x 2 days) after running out of Amio -> DCCV back to [facility] (Amio resumed).   3/18 hosp for AFIB + RVR (ch tight); also UTI -> meds adjusted.   ECG 10/14: SB (49), PACs  ECG 10/15: SB (54)  ECG 4/16: AFIB (123), LVH, ST-T changes  ECG 5/16: SB (56), IVCD, poor R-wave prog  ECG 6/16: AFIB (130), nonsp ST-T changes  ECG 6/16: AFIB (78)  ECG 6/16: SB (49)  ECG 6/16: SB (55)  ECG 3/17: SR (63), LVH, ?IMI  ECG 4/17: SB (46), 1st deg AVB, ?IMI, ?AMI  ECG 8/17: Jxnal rhythm (53), ?IMI, ?AMI  ECG 9/17: SR (60), PAC  ECG 9/17: AFIB (136)  ECG 10/17: A-pace (60), QTc 508  ECG 3/18: AFIB (107), QTc 480  ECG 3/18: A-paced (66), QTc 505  ECG 8/18: AFL (122), QTc 484  ECG 3/19: AFIB (117)  ECG 7/19: AT (82), V-pacing  ECG 10/19: AFIB/FL (92), demand V-pacing  ECG 10/22: TDS, AFL (129)  ECG 11/22: AF (152)  Echo 10/14: AFIB, EF 50-55%, sev conc LVH, sev LAE, mild AI, mild MR  Echo 8/17: EF 65%, pseudonl/DD, mod-sev LAE, mod-sev MAC, mild MS, midl AI  Echo 3/18: AFIB, EF 75-80%, sev conc LVH, sev LAE, mod AI, mod-sev MAC  Echo 3/19: EF 70-75%, mod-sev conc LVH, mod LAE, mod-sev MAC, mod AI, PASP 31  BRIAN 10/14: EF 55-60%, mod LAE, no thrombus  Cath 5/13: LM 20%, LAD  "22: no acute abnl  CT ab 11/22: athero of Ao + branches  CT chest 8/17: AsAo 4.2, dilated PA, athero of Ao and cors, small HH  CT brain 10/15: no acute abnl  CT brain 4/17: no acute abnl  CT brain 8/17: no acute abnl  LUE US 4/17: no DVT  LE US 8/23: no DVT  PM check 10/17: A pace 96%, V pace <1%, SB (48)  PM check 11/17: A pace 93%, V pace <1%, SB (52)  PM check 6/18: 1 HVR 4/10/18 (AF + RVR), AT/AF 2.4% (long 10hr)  PM check 3/19: A pace 88%, V pace 3%, 19 atrial arrhyhmias (8.5%), 3 VHR (AF + RVR)  PM check 10/19: A pace <1%, V pace 58%, persistent AFIB (), VHR x1 (<5min, AFIB+RVR)  PM check 8/20: 2 VHR (prob AF + RVR), 99% AFIB  PM check 3/21: 11 VHR (long 30s), 61% AF/AT  PM check 4/21: 2 VHR (long 3min), 100% AF/AT  PM check 7/21: VHR episodes (AF + RVR), >99% AFIB      Review of Systems   Constitutional: Positive for malaise/fatigue. Negative for chills and fever.   HENT:  Positive for hearing loss.    Eyes:  Negative for visual disturbance.   Respiratory:  Negative for cough, hemoptysis and wheezing.    Skin:  Negative for rash.   Musculoskeletal:  Negative for falls and myalgias.   Gastrointestinal:  Positive for nausea. Negative for bloating, abdominal pain, constipation, diarrhea, dysphagia and vomiting.   Genitourinary:  Negative for dysuria and hematuria.   Neurological:  Negative for headaches.   Psychiatric/Behavioral:  Positive for depression and memory loss. Negative for altered mental status.         Last Recorded Vitals:  Vitals:    10/13/23 1436   BP: 109/73   BP Location: Left arm   Patient Position: Sitting   Pulse: 80   SpO2: 94%   Height: 1.651 m (5' 5\")    "   Social History:  She reports that she has never smoked. She has never used smokeless tobacco. No history on file for alcohol use and drug use.    Family History:  Family History   Problem Relation Name Age of Onset    No Known Problems Mother      No Known Problems Father          Allergies:  Amiodarone, Amoxicillin, Codeine, Enalapril, Enalapril maleate, and Tramadol    Outpatient Medications:  Current Outpatient Medications   Medication Instructions    ammonium lactate (Amlactin) 12 % cream APPLY TO THE ARMS AND LEGS TWICE A DAY    ascorbic acid (VITAMIN C) 500 mg, oral, Daily    atorvastatin (Lipitor) 20 mg tablet TAKE 1 TABLET BY MOUTH EVERY DAY    b complex 0.4 mg tablet 1 tablet, oral, Daily    cholecalciferol (VITAMIN D-3) 25 mcg, oral, Daily RT    cyanocobalamin (VITAMIN B-12) 1,000 mcg, oral, Daily    dilTIAZem ER (Tiazac) 360 mg 24 hr capsule 1 capsule, oral, Daily    Eliquis 5 mg tablet TAKE 1 TABLET (5 MG) BY MOUTH IN THE MORNING AND 1 TABLET (5 MG) IN THE EVENING.    furosemide (LASIX) 20 mg, oral, Daily    levothyroxine (SYNTHROID, LEVOXYL) 88 mcg, oral, Daily    magnesium oxide (Mag-Ox) 400 mg (241.3 mg magnesium) tablet TAKE 1 TABLET BY MOUTH EVERY DAY    metoprolol succinate XL (TOPROL-XL) 100 mg, oral, Daily, Do not crush or chew.    multivitamin (MULTIPLE VITAMINS ORAL) 1 capsule, oral, Daily    nitroglycerin (Nitrostat) 0.4 mg SL tablet 1 tablet, sublingual, Every 5 min PRN    ondansetron (ZOFRAN) 4 mg, oral, As needed, For nausea    polyethylene glycol (GLYCOLAX, MIRALAX) 17 g, oral, Daily, In 8oz water    sertraline (ZOLOFT) 25 mg, oral, Daily    triamcinolone (Kenalog) 0.1 % cream 1 Application    zinc gluconate 50 mg tablet 50 mg of elemental zinc, oral, Daily       Physical Exam:  Constitutional: alert and in no acute distress.   Eyes: no erythema, swelling or discharge from the eye .   Neck: neck is supple, symmetric, trachea midline, no masses  and no thyromegaly .   Pulmonary: no  increased work of breathing or signs of respiratory distress  and bibasilar crackles  Cardiovascular: carotid pulses 2+ bilaterally with no bruit , JVP was normal, no thrills ,  the heart rate was normal the rhythm was regular A grade 2 systolic murmur was heard at the LUSB. A grade 2 systolic murmur was heard at the apex. and  bilateral ankle 1+ pitting edema.   Abdomen: abdomen non-tender, no masses .   Skin: skin warm and dry, normal skin turgor .   Psychiatric judgment and insight is normal  and oriented to person, place and time     Last Labs:  CBC -  Lab Results   Component Value Date    WBC 3.3 (L) 07/31/2023    HGB 11.9 (L) 07/31/2023    HCT 36.5 07/31/2023    MCV 95 07/31/2023     (L) 07/31/2023       CMP -  Lab Results   Component Value Date    CALCIUM 9.6 07/31/2023    PHOS 4.0 06/10/2019    PROT 6.6 07/31/2023    ALBUMIN 4.0 07/31/2023    AST 22 07/31/2023    ALT 13 07/31/2023    ALKPHOS 56 07/31/2023    BILITOT 0.8 07/31/2023       LIPID PANEL -   Lab Results   Component Value Date    CHOL 132 04/26/2023    TRIG 39 04/26/2023    HDL 56.4 04/26/2023    CHHDL 2.3 04/26/2023    LDLF 68 04/26/2023    VLDL 8 04/26/2023       RENAL FUNCTION PANEL -   Lab Results   Component Value Date    GLUCOSE 84 07/31/2023     07/31/2023    K 4.7 07/31/2023     07/31/2023    CO2 25 07/31/2023    ANIONGAP 13 07/31/2023    BUN 34 (H) 07/31/2023    CREATININE 1.15 (H) 07/31/2023    CALCIUM 9.6 07/31/2023    PHOS 4.0 06/10/2019    ALBUMIN 4.0 07/31/2023        Lab Results   Component Value Date     (H) 11/20/2022    HGBA1C 5.6 09/14/2022       Assessment/Plan   appears somewhat decompensated. Increase Lasix.  Prior PM checks with mostly AFIB, occ RVR.   She prefers to remain conservative and minimize meds as much as possible.  -continue Eliquis 5 bid  -continue  Kishore Gruber MD   No

## 2024-04-05 NOTE — H&P ADULT - PROBLEM SELECTOR PLAN 1
Detail Level: Generalized Detail Level: Simple Detail Level: Zone Moisturizer Recommendations: OTC Amlactin lotion/cream at least once daily. Detail Level: Detailed Prescription Strength Graduated Compression Stockings Recommendations: The patient was counseled that prescription strength graduated compression stockings should be worn for all waking hours. They will follow up with a venous specialist to monitor graduated compression stocking usage and their symptoms. aspirin. carotid doppler. echo cardiogram. lipids

## 2025-05-23 NOTE — ED PROVIDER NOTE - NEUROLOGICAL, MLM
Alert and oriented, no focal deficits, no motor or sensory deficits, 5/5 strength in JAMIE upper and lower extremities, no cog wheeling, resting tremor in JAMIE upper extremities unable to obtain blood pressure due to movement; brisk cap refill less than 2 seconds